# Patient Record
Sex: FEMALE | Race: OTHER | HISPANIC OR LATINO
[De-identification: names, ages, dates, MRNs, and addresses within clinical notes are randomized per-mention and may not be internally consistent; named-entity substitution may affect disease eponyms.]

---

## 2022-06-16 PROBLEM — Z00.00 ENCOUNTER FOR PREVENTIVE HEALTH EXAMINATION: Status: ACTIVE | Noted: 2022-06-16

## 2022-06-20 ENCOUNTER — APPOINTMENT (OUTPATIENT)
Dept: OBGYN | Facility: CLINIC | Age: 27
End: 2022-06-20
Payer: COMMERCIAL

## 2022-06-20 ENCOUNTER — APPOINTMENT (OUTPATIENT)
Dept: OBGYN | Facility: CLINIC | Age: 27
End: 2022-06-20

## 2022-06-20 ENCOUNTER — ASOB RESULT (OUTPATIENT)
Age: 27
End: 2022-06-20

## 2022-06-20 VITALS
OXYGEN SATURATION: 100 % | WEIGHT: 161 LBS | BODY MASS INDEX: 28.53 KG/M2 | SYSTOLIC BLOOD PRESSURE: 122 MMHG | RESPIRATION RATE: 16 BRPM | HEIGHT: 63 IN | DIASTOLIC BLOOD PRESSURE: 82 MMHG | HEART RATE: 90 BPM | TEMPERATURE: 98.1 F

## 2022-06-20 DIAGNOSIS — Z01.419 ENCOUNTER FOR GYNECOLOGICAL EXAMINATION (GENERAL) (ROUTINE) W/OUT ABNORMAL FINDINGS: ICD-10-CM

## 2022-06-20 PROCEDURE — 99395 PREV VISIT EST AGE 18-39: CPT

## 2022-06-20 PROCEDURE — 99203 OFFICE O/P NEW LOW 30 MIN: CPT | Mod: 25

## 2022-06-20 PROCEDURE — 76830 TRANSVAGINAL US NON-OB: CPT

## 2022-06-20 PROCEDURE — 99072 ADDL SUPL MATRL&STAF TM PHE: CPT

## 2022-06-20 PROCEDURE — 76817 TRANSVAGINAL US OBSTETRIC: CPT

## 2022-06-20 NOTE — HISTORY OF PRESENT ILLNESS
[FreeTextEntry1] : ALIA MEAD 25 YO, presents for Annual & Delayed menses\par  G 1  P 0000\par LMP: 04/30/2022  Irregular Menses\par Medication  Levothyroxine 112 mcg.\par Urine- UCG positive @ home  06/12/2022. \par Non smoker, no cats. \par No other health issues. \par No complaints.

## 2022-06-20 NOTE — PROCEDURE
[Transvaginal OB Sonogram] : Transvaginal OB Sonogram [Intrauterine Pregnancy] : intrauterine pregnancy [Yolk Sac] : yolk sac present [Fetal Heart] : fetal heart present [Transvaginal OB Sonogram WNL] : Transvaginal OB Sonogram WNL [FreeTextEntry1] : Viable SIUP @ 6.4 weeks giving an RUPINDER of 2/09/23

## 2022-06-22 LAB
C TRACH RRNA SPEC QL NAA+PROBE: NOT DETECTED
N GONORRHOEA RRNA SPEC QL NAA+PROBE: NOT DETECTED
SOURCE TP AMPLIFICATION: NORMAL

## 2022-06-24 LAB — CYTOLOGY CVX/VAG DOC THIN PREP: NORMAL

## 2022-07-07 ENCOUNTER — ASOB RESULT (OUTPATIENT)
Age: 27
End: 2022-07-07

## 2022-07-07 ENCOUNTER — APPOINTMENT (OUTPATIENT)
Dept: OBGYN | Facility: CLINIC | Age: 27
End: 2022-07-07

## 2022-07-07 ENCOUNTER — TRANSCRIPTION ENCOUNTER (OUTPATIENT)
Age: 27
End: 2022-07-07

## 2022-07-07 VITALS
TEMPERATURE: 98.1 F | RESPIRATION RATE: 16 BRPM | WEIGHT: 164 LBS | BODY MASS INDEX: 29.06 KG/M2 | DIASTOLIC BLOOD PRESSURE: 78 MMHG | HEART RATE: 89 BPM | SYSTOLIC BLOOD PRESSURE: 118 MMHG | OXYGEN SATURATION: 98 % | HEIGHT: 63 IN

## 2022-07-07 DIAGNOSIS — Z71.7 HUMAN IMMUNODEFICIENCY VIRUS [HIV] COUNSELING: ICD-10-CM

## 2022-07-07 DIAGNOSIS — Z34.90 ENCOUNTER FOR SUPERVISION OF NORMAL PREGNANCY, UNSPECIFIED, UNSPECIFIED TRIMESTER: ICD-10-CM

## 2022-07-07 DIAGNOSIS — N92.6 IRREGULAR MENSTRUATION, UNSPECIFIED: ICD-10-CM

## 2022-07-07 PROCEDURE — 76801 OB US < 14 WKS SINGLE FETUS: CPT

## 2022-07-07 PROCEDURE — 99213 OFFICE O/P EST LOW 20 MIN: CPT

## 2022-07-07 PROCEDURE — 99072 ADDL SUPL MATRL&STAF TM PHE: CPT

## 2022-07-07 NOTE — PROCEDURE
[Transvaginal OB Sonogram] : Transvaginal OB Sonogram [Intrauterine Pregnancy] : intrauterine pregnancy [Yolk Sac] : yolk sac present [Fetal Heart] : fetal heart present [Date: ___] : Date: [unfilled] [Current GA by Sonogram: ___ (wks)] : Current GA by Sonogram: [unfilled]Uwks [___ day(s)] : [unfilled] days [Transvaginal OB Sonogram WNL] : Transvaginal OB Sonogram WNL [FreeTextEntry1] : Viable SIUP @ 9.1 weeks. \par Giving an RUPINDER of 02/08/23.

## 2022-07-07 NOTE — HISTORY OF PRESENT ILLNESS
[FreeTextEntry1] : ALIA MEAD 25 YO, presents for Results,  Sonogram & Blood work.referral \par  G 1  P 0000\par LMP: 04/30/2022  Regular Menses\par Medication Levothyroxine 112 mcg.\par PAP- WNL, NG & CT- Negative. \par Patients insurance does not cover [facility].\par Will send her to another lab.\par HIV Counseling done. \par NT & US @ [facility]- 7/25 to 8/05/22\par \par \par

## 2022-07-27 ENCOUNTER — APPOINTMENT (OUTPATIENT)
Dept: ANTEPARTUM | Facility: CLINIC | Age: 27
End: 2022-07-27

## 2022-07-27 ENCOUNTER — ASOB RESULT (OUTPATIENT)
Age: 27
End: 2022-07-27

## 2022-07-27 ENCOUNTER — LABORATORY RESULT (OUTPATIENT)
Age: 27
End: 2022-07-27

## 2022-07-27 PROCEDURE — 36415 COLL VENOUS BLD VENIPUNCTURE: CPT

## 2022-07-27 PROCEDURE — 99072 ADDL SUPL MATRL&STAF TM PHE: CPT

## 2022-07-27 PROCEDURE — 76813 OB US NUCHAL MEAS 1 GEST: CPT

## 2022-07-27 PROCEDURE — 76801 OB US < 14 WKS SINGLE FETUS: CPT

## 2022-08-04 ENCOUNTER — APPOINTMENT (OUTPATIENT)
Dept: OBGYN | Facility: CLINIC | Age: 27
End: 2022-08-04

## 2022-08-04 VITALS
RESPIRATION RATE: 16 BRPM | OXYGEN SATURATION: 99 % | DIASTOLIC BLOOD PRESSURE: 84 MMHG | WEIGHT: 165 LBS | BODY MASS INDEX: 29.23 KG/M2 | TEMPERATURE: 98.7 F | SYSTOLIC BLOOD PRESSURE: 122 MMHG | HEART RATE: 99 BPM | HEIGHT: 63 IN

## 2022-08-04 PROCEDURE — 0502F SUBSEQUENT PRENATAL CARE: CPT

## 2022-08-25 ENCOUNTER — APPOINTMENT (OUTPATIENT)
Dept: OBGYN | Facility: CLINIC | Age: 27
End: 2022-08-25

## 2022-08-25 VITALS
SYSTOLIC BLOOD PRESSURE: 128 MMHG | RESPIRATION RATE: 17 BRPM | BODY MASS INDEX: 29.95 KG/M2 | DIASTOLIC BLOOD PRESSURE: 84 MMHG | HEART RATE: 109 BPM | WEIGHT: 169 LBS | OXYGEN SATURATION: 100 % | TEMPERATURE: 98.6 F | HEIGHT: 63 IN

## 2022-08-25 PROCEDURE — 36415 COLL VENOUS BLD VENIPUNCTURE: CPT

## 2022-08-25 PROCEDURE — 0502F SUBSEQUENT PRENATAL CARE: CPT

## 2022-08-31 LAB — AFP PNL SERPL: NORMAL

## 2022-09-07 ENCOUNTER — NON-APPOINTMENT (OUTPATIENT)
Age: 27
End: 2022-09-07

## 2022-09-08 ENCOUNTER — APPOINTMENT (OUTPATIENT)
Dept: OBGYN | Facility: CLINIC | Age: 27
End: 2022-09-08

## 2022-09-08 VITALS
HEART RATE: 101 BPM | RESPIRATION RATE: 17 BRPM | HEIGHT: 63 IN | WEIGHT: 171 LBS | TEMPERATURE: 98.7 F | DIASTOLIC BLOOD PRESSURE: 84 MMHG | OXYGEN SATURATION: 100 % | SYSTOLIC BLOOD PRESSURE: 122 MMHG | BODY MASS INDEX: 30.3 KG/M2

## 2022-09-08 PROCEDURE — 0502F SUBSEQUENT PRENATAL CARE: CPT

## 2022-09-08 PROCEDURE — 36415 COLL VENOUS BLD VENIPUNCTURE: CPT

## 2022-09-09 NOTE — HISTORY OF PRESENT ILLNESS
[FreeTextEntry1] : ALIA MEAD 26 YO, presents for Blood work\par G 1  \par LMP: 04/30/2022 - Regular  Menses\par Medication: Levothyroxine, Prenatals\par

## 2022-09-21 ENCOUNTER — ASOB RESULT (OUTPATIENT)
Age: 27
End: 2022-09-21

## 2022-09-21 ENCOUNTER — APPOINTMENT (OUTPATIENT)
Dept: ANTEPARTUM | Facility: CLINIC | Age: 27
End: 2022-09-21

## 2022-09-21 PROCEDURE — 76811 OB US DETAILED SNGL FETUS: CPT

## 2022-09-21 PROCEDURE — 99072 ADDL SUPL MATRL&STAF TM PHE: CPT

## 2022-09-21 PROCEDURE — 99202 OFFICE O/P NEW SF 15 MIN: CPT | Mod: 25

## 2022-09-22 ENCOUNTER — APPOINTMENT (OUTPATIENT)
Dept: OBGYN | Facility: CLINIC | Age: 27
End: 2022-09-22

## 2022-10-06 ENCOUNTER — NON-APPOINTMENT (OUTPATIENT)
Age: 27
End: 2022-10-06

## 2022-10-06 ENCOUNTER — APPOINTMENT (OUTPATIENT)
Dept: OBGYN | Facility: CLINIC | Age: 27
End: 2022-10-06

## 2022-10-06 VITALS
RESPIRATION RATE: 16 BRPM | WEIGHT: 177 LBS | OXYGEN SATURATION: 100 % | BODY MASS INDEX: 31.36 KG/M2 | TEMPERATURE: 97.3 F | SYSTOLIC BLOOD PRESSURE: 124 MMHG | HEIGHT: 63 IN | HEART RATE: 96 BPM | DIASTOLIC BLOOD PRESSURE: 84 MMHG

## 2022-10-06 PROCEDURE — 0502F SUBSEQUENT PRENATAL CARE: CPT

## 2022-10-20 ENCOUNTER — APPOINTMENT (OUTPATIENT)
Dept: ANTEPARTUM | Facility: CLINIC | Age: 27
End: 2022-10-20

## 2022-10-20 ENCOUNTER — ASOB RESULT (OUTPATIENT)
Age: 27
End: 2022-10-20

## 2022-10-20 PROCEDURE — 76816 OB US FOLLOW-UP PER FETUS: CPT | Mod: 59

## 2022-10-20 PROCEDURE — 76819 FETAL BIOPHYS PROFIL W/O NST: CPT

## 2022-10-20 PROCEDURE — 99072 ADDL SUPL MATRL&STAF TM PHE: CPT

## 2022-10-27 ENCOUNTER — NON-APPOINTMENT (OUTPATIENT)
Age: 27
End: 2022-10-27

## 2022-10-27 ENCOUNTER — APPOINTMENT (OUTPATIENT)
Dept: OBGYN | Facility: CLINIC | Age: 27
End: 2022-10-27

## 2022-10-27 VITALS
BODY MASS INDEX: 31.89 KG/M2 | WEIGHT: 180 LBS | TEMPERATURE: 98.4 F | HEART RATE: 99 BPM | SYSTOLIC BLOOD PRESSURE: 118 MMHG | OXYGEN SATURATION: 99 % | DIASTOLIC BLOOD PRESSURE: 80 MMHG | RESPIRATION RATE: 16 BRPM | HEIGHT: 63 IN

## 2022-10-27 PROCEDURE — 0502F SUBSEQUENT PRENATAL CARE: CPT

## 2022-11-16 ENCOUNTER — NON-APPOINTMENT (OUTPATIENT)
Age: 27
End: 2022-11-16

## 2022-11-17 ENCOUNTER — LABORATORY RESULT (OUTPATIENT)
Age: 27
End: 2022-11-17

## 2022-11-17 ENCOUNTER — APPOINTMENT (OUTPATIENT)
Dept: OBGYN | Facility: CLINIC | Age: 27
End: 2022-11-17

## 2022-11-17 VITALS
BODY MASS INDEX: 31.89 KG/M2 | HEIGHT: 63 IN | SYSTOLIC BLOOD PRESSURE: 110 MMHG | RESPIRATION RATE: 16 BRPM | TEMPERATURE: 98 F | WEIGHT: 180 LBS | HEART RATE: 100 BPM | DIASTOLIC BLOOD PRESSURE: 75 MMHG | OXYGEN SATURATION: 97 %

## 2022-11-17 PROCEDURE — 0502F SUBSEQUENT PRENATAL CARE: CPT

## 2022-11-17 PROCEDURE — 36415 COLL VENOUS BLD VENIPUNCTURE: CPT

## 2022-11-18 LAB
APPEARANCE: ABNORMAL
BASOPHILS # BLD AUTO: 0.03 K/UL
BASOPHILS NFR BLD AUTO: 0.3 %
BILIRUBIN URINE: NEGATIVE
BLOOD URINE: NEGATIVE
COLOR: NORMAL
EOSINOPHIL # BLD AUTO: 0.12 K/UL
EOSINOPHIL NFR BLD AUTO: 1.3 %
GLUCOSE 1H P 50 G GLC PO SERPL-MCNC: 150 MG/DL
GLUCOSE QUALITATIVE U: NEGATIVE
HCT VFR BLD CALC: 36.8 %
HGB BLD-MCNC: 11.6 G/DL
IMM GRANULOCYTES NFR BLD AUTO: 1.2 %
KETONES URINE: NEGATIVE
LEUKOCYTE ESTERASE URINE: NEGATIVE
LYMPHOCYTES # BLD AUTO: 1.42 K/UL
LYMPHOCYTES NFR BLD AUTO: 15.9 %
MAN DIFF?: NORMAL
MCHC RBC-ENTMCNC: 30.8 PG
MCHC RBC-ENTMCNC: 31.5 GM/DL
MCV RBC AUTO: 97.6 FL
MONOCYTES # BLD AUTO: 0.67 K/UL
MONOCYTES NFR BLD AUTO: 7.5 %
NEUTROPHILS # BLD AUTO: 6.57 K/UL
NEUTROPHILS NFR BLD AUTO: 73.8 %
NITRITE URINE: NEGATIVE
PH URINE: 7
PLATELET # BLD AUTO: 256 K/UL
PROTEIN URINE: NORMAL
RBC # BLD: 3.77 M/UL
RBC # FLD: 13.2 %
SPECIFIC GRAVITY URINE: 1.01
UROBILINOGEN URINE: NORMAL
WBC # FLD AUTO: 8.92 K/UL

## 2022-12-06 ENCOUNTER — NON-APPOINTMENT (OUTPATIENT)
Age: 27
End: 2022-12-06

## 2022-12-07 ENCOUNTER — APPOINTMENT (OUTPATIENT)
Dept: OBGYN | Facility: CLINIC | Age: 27
End: 2022-12-07

## 2022-12-07 ENCOUNTER — ASOB RESULT (OUTPATIENT)
Age: 27
End: 2022-12-07

## 2022-12-07 VITALS
HEIGHT: 63 IN | WEIGHT: 182 LBS | DIASTOLIC BLOOD PRESSURE: 80 MMHG | TEMPERATURE: 98.2 F | OXYGEN SATURATION: 98 % | SYSTOLIC BLOOD PRESSURE: 118 MMHG | RESPIRATION RATE: 16 BRPM | HEART RATE: 96 BPM | BODY MASS INDEX: 32.25 KG/M2

## 2022-12-07 PROCEDURE — 36415 COLL VENOUS BLD VENIPUNCTURE: CPT

## 2022-12-07 PROCEDURE — 76819 FETAL BIOPHYS PROFIL W/O NST: CPT

## 2022-12-07 PROCEDURE — 0502F SUBSEQUENT PRENATAL CARE: CPT

## 2022-12-09 ENCOUNTER — APPOINTMENT (OUTPATIENT)
Dept: OBGYN | Facility: CLINIC | Age: 27
End: 2022-12-09

## 2022-12-09 LAB — GLUCOSE 2H P MEAL SERPL-MCNC: 90 MG/DL

## 2022-12-09 PROCEDURE — 99441: CPT

## 2022-12-09 RX ORDER — BLOOD SUGAR DIAGNOSTIC
STRIP MISCELLANEOUS 4 TIMES DAILY
Qty: 120 | Refills: 8 | Status: ACTIVE | COMMUNITY
Start: 2022-12-09 | End: 1900-01-01

## 2022-12-09 RX ORDER — LANCETS 28 GAUGE
EACH MISCELLANEOUS
Qty: 120 | Refills: 8 | Status: ACTIVE | COMMUNITY
Start: 2022-12-09 | End: 1900-01-01

## 2022-12-09 RX ORDER — BLOOD-GLUCOSE METER
W/DEVICE KIT MISCELLANEOUS
Qty: 1 | Refills: 0 | Status: ACTIVE | COMMUNITY
Start: 2022-12-09 | End: 1900-01-01

## 2022-12-19 ENCOUNTER — ASOB RESULT (OUTPATIENT)
Age: 27
End: 2022-12-19

## 2022-12-19 ENCOUNTER — APPOINTMENT (OUTPATIENT)
Dept: MATERNAL FETAL MEDICINE | Facility: CLINIC | Age: 27
End: 2022-12-19
Payer: COMMERCIAL

## 2022-12-19 DIAGNOSIS — O24.419 GESTATIONAL DIABETES MELLITUS IN PREGNANCY, UNSPECIFIED CONTROL: ICD-10-CM

## 2022-12-19 PROCEDURE — G0109 DIAB MANAGE TRN IND/GROUP: CPT | Mod: 95

## 2022-12-19 PROCEDURE — G0108 DIAB MANAGE TRN  PER INDIV: CPT | Mod: 95

## 2022-12-20 RX ORDER — URINE ACETONE TEST STRIPS
STRIP MISCELLANEOUS
Qty: 1 | Refills: 2 | Status: ACTIVE | COMMUNITY
Start: 2022-12-19 | End: 1900-01-01

## 2022-12-22 ENCOUNTER — NON-APPOINTMENT (OUTPATIENT)
Age: 27
End: 2022-12-22

## 2022-12-22 ENCOUNTER — APPOINTMENT (OUTPATIENT)
Dept: OBGYN | Facility: CLINIC | Age: 27
End: 2022-12-22

## 2022-12-22 VITALS
BODY MASS INDEX: 32.25 KG/M2 | DIASTOLIC BLOOD PRESSURE: 78 MMHG | WEIGHT: 182 LBS | HEART RATE: 113 BPM | RESPIRATION RATE: 17 BRPM | TEMPERATURE: 97.6 F | SYSTOLIC BLOOD PRESSURE: 122 MMHG | HEIGHT: 63 IN | OXYGEN SATURATION: 97 %

## 2022-12-22 LAB
BILIRUB UR QL STRIP: NEGATIVE
CLARITY UR: CLEAR
COLLECTION METHOD: NORMAL
GLUCOSE UR-MCNC: NEGATIVE
HCG UR QL: 0.2 EU/DL
HGB UR QL STRIP.AUTO: NEGATIVE
KETONES UR-MCNC: NEGATIVE
LEUKOCYTE ESTERASE UR QL STRIP: NEGATIVE
NITRITE UR QL STRIP: NEGATIVE
PH UR STRIP: 6
PROT UR STRIP-MCNC: NEGATIVE
SP GR UR STRIP: 1.01

## 2022-12-22 PROCEDURE — 81002 URINALYSIS NONAUTO W/O SCOPE: CPT

## 2022-12-22 PROCEDURE — 0502F SUBSEQUENT PRENATAL CARE: CPT

## 2022-12-27 ENCOUNTER — ASOB RESULT (OUTPATIENT)
Age: 27
End: 2022-12-27

## 2022-12-27 ENCOUNTER — APPOINTMENT (OUTPATIENT)
Dept: ANTEPARTUM | Facility: CLINIC | Age: 27
End: 2022-12-27

## 2022-12-27 PROCEDURE — 99072 ADDL SUPL MATRL&STAF TM PHE: CPT

## 2022-12-27 PROCEDURE — 76816 OB US FOLLOW-UP PER FETUS: CPT

## 2022-12-27 PROCEDURE — 76819 FETAL BIOPHYS PROFIL W/O NST: CPT | Mod: 59

## 2023-01-03 ENCOUNTER — APPOINTMENT (OUTPATIENT)
Dept: MATERNAL FETAL MEDICINE | Facility: CLINIC | Age: 28
End: 2023-01-03
Payer: COMMERCIAL

## 2023-01-03 ENCOUNTER — ASOB RESULT (OUTPATIENT)
Age: 28
End: 2023-01-03

## 2023-01-03 PROCEDURE — G0108 DIAB MANAGE TRN  PER INDIV: CPT | Mod: 95

## 2023-01-04 ENCOUNTER — APPOINTMENT (OUTPATIENT)
Dept: ANTEPARTUM | Facility: CLINIC | Age: 28
End: 2023-01-04

## 2023-01-12 ENCOUNTER — NON-APPOINTMENT (OUTPATIENT)
Age: 28
End: 2023-01-12

## 2023-01-12 ENCOUNTER — APPOINTMENT (OUTPATIENT)
Dept: OBGYN | Facility: CLINIC | Age: 28
End: 2023-01-12
Payer: COMMERCIAL

## 2023-01-12 ENCOUNTER — ASOB RESULT (OUTPATIENT)
Age: 28
End: 2023-01-12

## 2023-01-12 VITALS
WEIGHT: 184 LBS | OXYGEN SATURATION: 99 % | DIASTOLIC BLOOD PRESSURE: 74 MMHG | BODY MASS INDEX: 32.6 KG/M2 | HEART RATE: 100 BPM | TEMPERATURE: 97.3 F | HEIGHT: 63 IN | SYSTOLIC BLOOD PRESSURE: 108 MMHG | RESPIRATION RATE: 16 BRPM

## 2023-01-12 PROCEDURE — 81002 URINALYSIS NONAUTO W/O SCOPE: CPT

## 2023-01-12 PROCEDURE — 76818 FETAL BIOPHYS PROFILE W/NST: CPT

## 2023-01-12 PROCEDURE — 0502F SUBSEQUENT PRENATAL CARE: CPT

## 2023-01-12 PROCEDURE — 76805 OB US >/= 14 WKS SNGL FETUS: CPT

## 2023-01-14 LAB
GP B STREP DNA SPEC QL NAA+PROBE: NOT DETECTED
SOURCE GBS: NORMAL

## 2023-01-17 ENCOUNTER — APPOINTMENT (OUTPATIENT)
Dept: MATERNAL FETAL MEDICINE | Facility: CLINIC | Age: 28
End: 2023-01-17
Payer: COMMERCIAL

## 2023-01-17 ENCOUNTER — ASOB RESULT (OUTPATIENT)
Age: 28
End: 2023-01-17

## 2023-01-17 PROCEDURE — G0108 DIAB MANAGE TRN  PER INDIV: CPT | Mod: 95

## 2023-01-19 ENCOUNTER — RESULT CHARGE (OUTPATIENT)
Age: 28
End: 2023-01-19

## 2023-01-19 ENCOUNTER — APPOINTMENT (OUTPATIENT)
Dept: OBGYN | Facility: CLINIC | Age: 28
End: 2023-01-19
Payer: COMMERCIAL

## 2023-01-19 ENCOUNTER — ASOB RESULT (OUTPATIENT)
Age: 28
End: 2023-01-19

## 2023-01-19 ENCOUNTER — OUTPATIENT (OUTPATIENT)
Dept: INPATIENT UNIT | Facility: HOSPITAL | Age: 28
LOS: 1 days | Discharge: HOME | End: 2023-01-19
Payer: COMMERCIAL

## 2023-01-19 VITALS
DIASTOLIC BLOOD PRESSURE: 70 MMHG | HEART RATE: 98 BPM | OXYGEN SATURATION: 98 % | TEMPERATURE: 97.7 F | SYSTOLIC BLOOD PRESSURE: 106 MMHG | BODY MASS INDEX: 32.96 KG/M2 | WEIGHT: 186 LBS | RESPIRATION RATE: 16 BRPM | HEIGHT: 63 IN

## 2023-01-19 VITALS
RESPIRATION RATE: 16 BRPM | SYSTOLIC BLOOD PRESSURE: 135 MMHG | DIASTOLIC BLOOD PRESSURE: 87 MMHG | HEART RATE: 101 BPM | TEMPERATURE: 99 F

## 2023-01-19 VITALS — HEART RATE: 84 BPM | DIASTOLIC BLOOD PRESSURE: 57 MMHG | SYSTOLIC BLOOD PRESSURE: 105 MMHG

## 2023-01-19 DIAGNOSIS — O26.899 OTHER SPECIFIED PREGNANCY RELATED CONDITIONS, UNSPECIFIED TRIMESTER: ICD-10-CM

## 2023-01-19 PROCEDURE — 76818 FETAL BIOPHYS PROFILE W/NST: CPT

## 2023-01-19 PROCEDURE — 0502F SUBSEQUENT PRENATAL CARE: CPT

## 2023-01-19 PROCEDURE — 99214 OFFICE O/P EST MOD 30 MIN: CPT

## 2023-01-19 PROCEDURE — 76818 FETAL BIOPHYS PROFILE W/NST: CPT | Mod: 26

## 2023-01-19 PROCEDURE — 81002 URINALYSIS NONAUTO W/O SCOPE: CPT

## 2023-01-19 NOTE — OB PROVIDER TRIAGE NOTE - ADDITIONAL INSTRUCTIONS
Follow up at next scheduled prenatal appointment  Return for decreased fetal movement, loss of fluid or vaginal bleeding  Increase oral hydration  Continue to monitor finger sticks as directed for gestational diabetes   Signs and Symptoms of Labor reviewed  Kick Counts Reviewed

## 2023-01-19 NOTE — OB PROVIDER TRIAGE NOTE - PLAN OF CARE
D/C Home  D/W Dr. Herman  NST Reactive  BPP8/8  No evidence of acute process  Normal fetal testing  Follow up at next scheduled prenatal appointment  Return for decreased fetal movement, loss of fluid or vaginal bleeding  Increase oral hydration  Continue to monitor finger sticks as directed for gestational diabetes   Signs and Symptoms of Labor reviewed  Kick Counts Reviewed

## 2023-01-19 NOTE — OB RN TRIAGE NOTE - FALL HARM RISK - UNIVERSAL INTERVENTIONS
Bed in lowest position, wheels locked, appropriate side rails in place/Call bell, personal items and telephone in reach/Instruct patient to call for assistance before getting out of bed or chair/Non-slip footwear when patient is out of bed/[place] to call system/Physically safe environment - no spills, clutter or unnecessary equipment/Purposeful Proactive Rounding/Room/bathroom lighting operational, light cord in reach

## 2023-01-19 NOTE — OB PROVIDER TRIAGE NOTE - HISTORY OF PRESENT ILLNESS
28y/o  @37.1wks presents from Dr. Alcaraz's office with variables on NST and lizzy of 6.5cm.  Reports good fetal movement  Denies LOF/VB    Allergies: Denies  Medications: PNV, Levothyroxine 150mcg    Medical HX: Hypothyroidism  Surgical HX: Denies  Denies etoh/smoke/drugs/psy   28y/o  @37.1wks presents from Dr. Alcaraz's office with variables on NST and lizzy of 6.5cm.  Reports good fetal movement  Denies LOF/VB  Denies abdominal pain or contractions     Allergies: Denies  Medications: PNV, Levothyroxine 150mcg    Medical HX: Hypothyroidism  Surgical HX: Denies  Denies etoh/smoke/drugs/psy

## 2023-01-19 NOTE — OB PROVIDER TRIAGE NOTE - NSHPPHYSICALEXAM_GEN_ALL_CORE
Vital Signs Last 24 Hrs  T(C): 37.3 (19 Jan 2023 18:59), Max: 37.3 (19 Jan 2023 18:59)  T(F): 99.1 (19 Jan 2023 18:59), Max: 99.1 (19 Jan 2023 18:59)  HR: 96 (19 Jan 2023 19:19) (96 - 101)  BP: 112/68 (19 Jan 2023 19:19) (112/68 - 135/87)  RR: 16 (19 Jan 2023 18:59) (16 - 16)    Assessment reveals VSS  General: Female sitting comfortably in no apparent distress  Neuro: No facial asymmetry, no slurred speech, moves all 4 extremities  Mood: Alert and lucid, appropriate mood and affect  A&Ox3  Lungs- clear bilateral  Heart- normal rate and regular rhythm  Extremities- Warm, Dry, no edema present, good pulses   Abdomen soft, NT, gravid  Cat 1 tracing, occasional   Transabdominal Ultrasound-       PLAN: Vital Signs Last 24 Hrs  T(C): 37.3 (19 Jan 2023 18:59), Max: 37.3 (19 Jan 2023 18:59)  T(F): 99.1 (19 Jan 2023 18:59), Max: 99.1 (19 Jan 2023 18:59)  HR: 96 (19 Jan 2023 19:19) (96 - 101)  BP: 112/68 (19 Jan 2023 19:19) (112/68 - 135/87)  RR: 16 (19 Jan 2023 18:59) (16 - 16)    Assessment reveals VSS  General: Female sitting comfortably in no apparent distress  Neuro: No facial asymmetry, no slurred speech, moves all 4 extremities  Mood: Alert and lucid, appropriate mood and affect  A&Ox3  Lungs- clear bilateral  Heart- normal rate and regular rhythm  Extremities- Warm, Dry, no edema present, good pulses   Abdomen soft, NT, gravid  Cat 1 tracing, occasional   Transabdominal Ultrasound- vtx, ant placenta, bpp8/8, lizzy: 11.11      PLAN: D/C Home

## 2023-01-23 ENCOUNTER — APPOINTMENT (OUTPATIENT)
Dept: ANTEPARTUM | Facility: CLINIC | Age: 28
End: 2023-01-23
Payer: COMMERCIAL

## 2023-01-23 ENCOUNTER — ASOB RESULT (OUTPATIENT)
Age: 28
End: 2023-01-23

## 2023-01-23 PROBLEM — E03.9 HYPOTHYROIDISM, UNSPECIFIED: Chronic | Status: ACTIVE | Noted: 2023-01-19

## 2023-01-23 PROCEDURE — 99072 ADDL SUPL MATRL&STAF TM PHE: CPT

## 2023-01-23 PROCEDURE — 76819 FETAL BIOPHYS PROFIL W/O NST: CPT

## 2023-01-23 PROCEDURE — ZZZZZ: CPT

## 2023-01-23 PROCEDURE — 99212 OFFICE O/P EST SF 10 MIN: CPT | Mod: 25

## 2023-01-24 ENCOUNTER — APPOINTMENT (OUTPATIENT)
Dept: MATERNAL FETAL MEDICINE | Facility: CLINIC | Age: 28
End: 2023-01-24
Payer: COMMERCIAL

## 2023-01-24 ENCOUNTER — ASOB RESULT (OUTPATIENT)
Age: 28
End: 2023-01-24

## 2023-01-24 PROCEDURE — G0108 DIAB MANAGE TRN  PER INDIV: CPT | Mod: 95

## 2023-01-25 DIAGNOSIS — Z3A.37 37 WEEKS GESTATION OF PREGNANCY: ICD-10-CM

## 2023-01-25 DIAGNOSIS — Z03.79 ENCOUNTER FOR OTHER SUSPECTED MATERNAL AND FETAL CONDITIONS RULED OUT: ICD-10-CM

## 2023-01-25 DIAGNOSIS — O99.283 ENDOCRINE, NUTRITIONAL AND METABOLIC DISEASES COMPLICATING PREGNANCY, THIRD TRIMESTER: ICD-10-CM

## 2023-01-25 DIAGNOSIS — E03.9 HYPOTHYROIDISM, UNSPECIFIED: ICD-10-CM

## 2023-01-25 DIAGNOSIS — O24.410 GESTATIONAL DIABETES MELLITUS IN PREGNANCY, DIET CONTROLLED: ICD-10-CM

## 2023-01-26 ENCOUNTER — NON-APPOINTMENT (OUTPATIENT)
Age: 28
End: 2023-01-26

## 2023-01-26 ENCOUNTER — APPOINTMENT (OUTPATIENT)
Dept: OBGYN | Facility: CLINIC | Age: 28
End: 2023-01-26
Payer: COMMERCIAL

## 2023-01-26 ENCOUNTER — ASOB RESULT (OUTPATIENT)
Age: 28
End: 2023-01-26

## 2023-01-26 VITALS
TEMPERATURE: 97.5 F | HEIGHT: 63 IN | HEART RATE: 96 BPM | WEIGHT: 188 LBS | BODY MASS INDEX: 33.31 KG/M2 | DIASTOLIC BLOOD PRESSURE: 86 MMHG | OXYGEN SATURATION: 98 % | SYSTOLIC BLOOD PRESSURE: 118 MMHG | RESPIRATION RATE: 16 BRPM

## 2023-01-26 DIAGNOSIS — Z34.90 ENCOUNTER FOR SUPERVISION OF NORMAL PREGNANCY, UNSPECIFIED, UNSPECIFIED TRIMESTER: ICD-10-CM

## 2023-01-26 LAB
BILIRUB UR QL STRIP: NEGATIVE
CLARITY UR: CLEAR
COLLECTION METHOD: NORMAL
GLUCOSE UR-MCNC: NEGATIVE
HCG UR QL: 0.2 EU/DL
HGB UR QL STRIP.AUTO: NEGATIVE
KETONES UR-MCNC: NEGATIVE
LEUKOCYTE ESTERASE UR QL STRIP: NEGATIVE
NITRITE UR QL STRIP: NEGATIVE
PH UR STRIP: 7
PROT UR STRIP-MCNC: NEGATIVE
SP GR UR STRIP: 1.01

## 2023-01-26 PROCEDURE — 81002 URINALYSIS NONAUTO W/O SCOPE: CPT

## 2023-01-26 PROCEDURE — 0502F SUBSEQUENT PRENATAL CARE: CPT

## 2023-01-26 PROCEDURE — 76818 FETAL BIOPHYS PROFILE W/NST: CPT

## 2023-02-01 ENCOUNTER — TRANSCRIPTION ENCOUNTER (OUTPATIENT)
Age: 28
End: 2023-02-01

## 2023-02-01 ENCOUNTER — INPATIENT (INPATIENT)
Facility: HOSPITAL | Age: 28
LOS: 1 days | Discharge: HOME | End: 2023-02-03
Attending: OBSTETRICS & GYNECOLOGY | Admitting: OBSTETRICS & GYNECOLOGY
Payer: COMMERCIAL

## 2023-02-01 VITALS
DIASTOLIC BLOOD PRESSURE: 82 MMHG | HEART RATE: 96 BPM | SYSTOLIC BLOOD PRESSURE: 136 MMHG | RESPIRATION RATE: 16 BRPM | TEMPERATURE: 98 F

## 2023-02-01 DIAGNOSIS — O26.899 OTHER SPECIFIED PREGNANCY RELATED CONDITIONS, UNSPECIFIED TRIMESTER: ICD-10-CM

## 2023-02-01 LAB
BASOPHILS # BLD AUTO: 0.03 K/UL — SIGNIFICANT CHANGE UP (ref 0–0.2)
BASOPHILS NFR BLD AUTO: 0.5 % — SIGNIFICANT CHANGE UP (ref 0–2)
BLD GP AB SCN SERPL QL: NEGATIVE — SIGNIFICANT CHANGE UP
COVID-19 SPIKE DOMAIN AB INTERP: POSITIVE
COVID-19 SPIKE DOMAIN ANTIBODY RESULT: >250 U/ML — HIGH
EOSINOPHIL # BLD AUTO: 0.03 K/UL — SIGNIFICANT CHANGE UP (ref 0–0.5)
EOSINOPHIL NFR BLD AUTO: 0.5 % — SIGNIFICANT CHANGE UP (ref 0–6)
GLUCOSE BLDC GLUCOMTR-MCNC: 82 MG/DL — SIGNIFICANT CHANGE UP (ref 70–99)
GLUCOSE BLDC GLUCOMTR-MCNC: 83 MG/DL — SIGNIFICANT CHANGE UP (ref 70–99)
GLUCOSE BLDC GLUCOMTR-MCNC: 87 MG/DL — SIGNIFICANT CHANGE UP (ref 70–99)
GLUCOSE BLDC GLUCOMTR-MCNC: 91 MG/DL — SIGNIFICANT CHANGE UP (ref 70–99)
GLUCOSE BLDC GLUCOMTR-MCNC: 91 MG/DL — SIGNIFICANT CHANGE UP (ref 70–99)
HBV SURFACE AG SERPL QL IA: SIGNIFICANT CHANGE UP
HCT VFR BLD CALC: 38.5 % — SIGNIFICANT CHANGE UP (ref 34.5–45)
HGB BLD-MCNC: 12.9 G/DL — SIGNIFICANT CHANGE UP (ref 11.5–15.5)
HIV 1+2 AB+HIV1 P24 AG SERPL QL IA: SIGNIFICANT CHANGE UP
IANC: 4.24 K/UL — SIGNIFICANT CHANGE UP (ref 1.8–7.4)
IMM GRANULOCYTES NFR BLD AUTO: 0.6 % — SIGNIFICANT CHANGE UP (ref 0–0.9)
LYMPHOCYTES # BLD AUTO: 1.46 K/UL — SIGNIFICANT CHANGE UP (ref 1–3.3)
LYMPHOCYTES # BLD AUTO: 23 % — SIGNIFICANT CHANGE UP (ref 13–44)
MCHC RBC-ENTMCNC: 30.4 PG — SIGNIFICANT CHANGE UP (ref 27–34)
MCHC RBC-ENTMCNC: 33.5 GM/DL — SIGNIFICANT CHANGE UP (ref 32–36)
MCV RBC AUTO: 90.8 FL — SIGNIFICANT CHANGE UP (ref 80–100)
MONOCYTES # BLD AUTO: 0.56 K/UL — SIGNIFICANT CHANGE UP (ref 0–0.9)
MONOCYTES NFR BLD AUTO: 8.8 % — SIGNIFICANT CHANGE UP (ref 2–14)
NEUTROPHILS # BLD AUTO: 4.24 K/UL — SIGNIFICANT CHANGE UP (ref 1.8–7.4)
NEUTROPHILS NFR BLD AUTO: 66.6 % — SIGNIFICANT CHANGE UP (ref 43–77)
NRBC # BLD: 0 /100 WBCS — SIGNIFICANT CHANGE UP (ref 0–0)
NRBC # FLD: 0 K/UL — SIGNIFICANT CHANGE UP (ref 0–0)
PLATELET # BLD AUTO: 205 K/UL — SIGNIFICANT CHANGE UP (ref 150–400)
RBC # BLD: 4.24 M/UL — SIGNIFICANT CHANGE UP (ref 3.8–5.2)
RBC # FLD: 12.9 % — SIGNIFICANT CHANGE UP (ref 10.3–14.5)
RH IG SCN BLD-IMP: POSITIVE — SIGNIFICANT CHANGE UP
RH IG SCN BLD-IMP: POSITIVE — SIGNIFICANT CHANGE UP
RUBV IGG SER-ACNC: 1.1 INDEX — SIGNIFICANT CHANGE UP
RUBV IGG SER-IMP: POSITIVE — SIGNIFICANT CHANGE UP
SARS-COV-2 IGG+IGM SERPL QL IA: >250 U/ML — HIGH
SARS-COV-2 IGG+IGM SERPL QL IA: POSITIVE
SARS-COV-2 RNA SPEC QL NAA+PROBE: SIGNIFICANT CHANGE UP
T PALLIDUM AB TITR SER: NEGATIVE — SIGNIFICANT CHANGE UP
WBC # BLD: 6.36 K/UL — SIGNIFICANT CHANGE UP (ref 3.8–10.5)
WBC # FLD AUTO: 6.36 K/UL — SIGNIFICANT CHANGE UP (ref 3.8–10.5)

## 2023-02-01 RX ORDER — OXYTOCIN 10 UNIT/ML
VIAL (ML) INJECTION
Qty: 30 | Refills: 0 | Status: DISCONTINUED | OUTPATIENT
Start: 2023-02-01 | End: 2023-02-02

## 2023-02-01 RX ORDER — SODIUM CHLORIDE 9 MG/ML
1000 INJECTION, SOLUTION INTRAVENOUS
Refills: 0 | Status: DISCONTINUED | OUTPATIENT
Start: 2023-02-01 | End: 2023-02-01

## 2023-02-01 RX ORDER — INFLUENZA VIRUS VACCINE 15; 15; 15; 15 UG/.5ML; UG/.5ML; UG/.5ML; UG/.5ML
0.5 SUSPENSION INTRAMUSCULAR ONCE
Refills: 0 | Status: DISCONTINUED | OUTPATIENT
Start: 2023-02-01 | End: 2023-02-03

## 2023-02-01 RX ORDER — SODIUM CHLORIDE 9 MG/ML
1000 INJECTION INTRAMUSCULAR; INTRAVENOUS; SUBCUTANEOUS
Refills: 0 | Status: DISCONTINUED | OUTPATIENT
Start: 2023-02-01 | End: 2023-02-01

## 2023-02-01 RX ORDER — SODIUM CHLORIDE 9 MG/ML
1000 INJECTION, SOLUTION INTRAVENOUS
Refills: 0 | Status: DISCONTINUED | OUTPATIENT
Start: 2023-02-01 | End: 2023-02-03

## 2023-02-01 RX ORDER — OXYTOCIN 10 UNIT/ML
333.33 VIAL (ML) INJECTION
Qty: 20 | Refills: 0 | Status: DISCONTINUED | OUTPATIENT
Start: 2023-02-01 | End: 2023-02-02

## 2023-02-01 RX ORDER — LEVOTHYROXINE SODIUM 125 MCG
150 TABLET ORAL DAILY
Refills: 0 | Status: DISCONTINUED | OUTPATIENT
Start: 2023-02-01 | End: 2023-02-03

## 2023-02-01 RX ORDER — CHLORHEXIDINE GLUCONATE 213 G/1000ML
1 SOLUTION TOPICAL ONCE
Refills: 0 | Status: DISCONTINUED | OUTPATIENT
Start: 2023-02-01 | End: 2023-02-02

## 2023-02-01 RX ADMIN — SODIUM CHLORIDE 125 MILLILITER(S): 9 INJECTION, SOLUTION INTRAVENOUS at 16:14

## 2023-02-01 RX ADMIN — SODIUM CHLORIDE 125 MILLILITER(S): 9 INJECTION, SOLUTION INTRAVENOUS at 16:22

## 2023-02-01 RX ADMIN — Medication 150 MICROGRAM(S): at 06:54

## 2023-02-01 RX ADMIN — Medication 2 MILLIUNIT(S)/MIN: at 17:05

## 2023-02-01 NOTE — OB RN TRIAGE NOTE - NS_GESTAGE_OBGYN_ALL_OB_FT
Problem: Falls - Risk of:  Goal: Will remain free from falls  Will remain free from falls   Outcome: Ongoing  No falls thus far this shift. Patient ambulates with one assist and a walker. Patient has been educated on fall precautions and to not get up without assistance. Patient has side-rails up x 3 per patient request.  Goal: Absence of physical injury  Absence of physical injury   Outcome: Completed Date Met: 07/08/18  No injury thus far this shift. Patient has been educated on injury prevention. Problem: Risk for Impaired Skin Integrity  Goal: Tissue integrity - skin and mucous membranes  Structural intactness and normal physiological function of skin and  mucous membranes. Outcome: Ongoing  No new skin injury thus far this shift. Patient has blanchable redness on her buttocks. Patient refuses to turn as recommended. Patient stated that she is repositioning herself and refused pillow placement. Education provided regarding the benefits of repositioning and the risk to skin integrity associated with not repositioning as recommended. Problem: Pain:  Goal: Pain level will decrease  Pain level will decrease   Outcome: Ongoing  Pain Assessment: 0-10  Pain Level: 0   Pain goal:  No Pain  Is pain goal met at this time? Yes     Additional interventions to be implemented: position change    Problem: Discharge Planning:  Goal: Discharged to appropriate level of care  Discharged to appropriate level of care   Outcome: Ongoing  Patient plans to return home with daughter upon discharge. Problem: Nutrition  Goal: Optimal nutrition therapy  Outcome: Completed Date Met: 07/08/18  Patient has excellent appetite. Comments: Care plan reviewed with patient and daughter. Patient and daughter verbalize understanding of the plan of care and contribute to goal setting. 39w

## 2023-02-01 NOTE — OB PROVIDER LABOR PROGRESS NOTE - NS_SUBJECTIVE/OBJECTIVE_OBGYN_ALL_OB_FT
PGY1 Labor & Delivery Progress Note     Pt examined @ 2010 due to cat2 tracing     T(C): 36.7 (02-01-23 @ 18:30), Max: 36.9 (02-01-23 @ 04:19)  HR: 84 (02-01-23 @ 20:08) (62 - 108)  BP: 121/79 (02-01-23 @ 20:00) (101/67 - 155/66)  RR: 16 (02-01-23 @ 18:30) (16 - 19)  SpO2: 100% (02-01-23 @ 20:08) (90% - 100%)

## 2023-02-01 NOTE — OB PROVIDER H&P - PROBLEM SELECTOR PLAN 1
Admit for PROM IOL  Oral Cytotec  D/W Dr. Cuevas  Routine Orders  Covid Swabbed  GBS negative 1/12  GDMA1- Y-Tubing  Finger Sticks every 4 hours- 2 in Active Labor  Synthroid 150mcg, daily ordered

## 2023-02-01 NOTE — OB PROVIDER LABOR PROGRESS NOTE - ASSESSMENT
A/P:   27y  @ 39wga admitted for IOL for PROM@230a    #Labor   - On Oxytocin    #Fetal Wellbeing   - Cat 2. Tracing however w/ moderate variability. IUPC placed for amnioinfusion    # Issues   - Most recent FS 83 (GDMA1)    #Pain Control   -w/ Epi    Humphrey Bowden, PGY-1    d/w Dr. Paredes

## 2023-02-01 NOTE — OB RN TRIAGE NOTE - FALL HARM RISK - UNIVERSAL INTERVENTIONS
Bed in lowest position, wheels locked, appropriate side rails in place/Call bell, personal items and telephone in reach/Instruct patient to call for assistance before getting out of bed or chair/Non-slip footwear when patient is out of bed/[place] to call system/Physically safe environment - no spills, clutter or unnecessary equipment/Purposeful Proactive Rounding/Room/bathroom lighting operational, light cord in reach

## 2023-02-01 NOTE — OB PROVIDER H&P - HISTORY OF PRESENT ILLNESS
26y/o  @39wks presents with leaking of clear fluid since 2:30am and mild cramps.   Reports good fetal movement  Denies VB    Allergies: Denies   Medications: PNV, Synthroid 150mcg    Medical HX: Hypothyroidism   Surgical HX: Denies  Denies Etoh/Smoke/Drugs/Psy

## 2023-02-01 NOTE — OB PROVIDER H&P - NSHPPHYSICALEXAM_GEN_ALL_CORE
Vital Signs Last 24 Hrs  T(C): 36.9 (01 Feb 2023 04:27), Max: 36.9 (01 Feb 2023 04:19)  T(F): 98.42 (01 Feb 2023 04:27), Max: 98.42 (01 Feb 2023 04:27)  HR: 106 (01 Feb 2023 04:59) (96 - 106)  BP: 119/79 (01 Feb 2023 04:59) (119/79 - 136/82)  RR: 16 (01 Feb 2023 04:19) (16 - 16)    Parameters below as of 01 Feb 2023 04:19  Patient On (Oxygen Delivery Method): room air    Assessment reveals VSS  General: Female sitting comfortably in no apparent distress  Neuro: No facial asymmetry, no slurred speech, moves all 4 extremities  Mood: Alert and lucid, appropriate mood and affect  A&Ox3  Lungs- clear bilateral  Heart- normal rate and regular rhythm  Extremities- Warm, Dry, no edema present, good pulses   Abdomen soft, NT, gravid  Cat 1 tracing, ctx every 3-5 mins  Transabdominal Ultrasound- vtx      PLAN: Admit for IOL SROM

## 2023-02-01 NOTE — OB RN PATIENT PROFILE - FALL HARM RISK - UNIVERSAL INTERVENTIONS
Bed in lowest position, wheels locked, appropriate side rails in place/Call bell, personal items and telephone in reach/Instruct patient to call for assistance before getting out of bed or chair/Non-slip footwear when patient is out of bed/[place] to call system/Physically safe environment - no spills, clutter or unnecessary equipment/Purposeful Proactive Rounding/Room/bathroom lighting operational, light cord in reach

## 2023-02-02 ENCOUNTER — NON-APPOINTMENT (OUTPATIENT)
Age: 28
End: 2023-02-02

## 2023-02-02 ENCOUNTER — APPOINTMENT (OUTPATIENT)
Dept: OBGYN | Facility: CLINIC | Age: 28
End: 2023-02-02

## 2023-02-02 LAB
BASOPHILS # BLD AUTO: 0.02 K/UL — SIGNIFICANT CHANGE UP (ref 0–0.2)
BASOPHILS NFR BLD AUTO: 0.1 % — SIGNIFICANT CHANGE UP (ref 0–2)
EOSINOPHIL # BLD AUTO: 0 K/UL — SIGNIFICANT CHANGE UP (ref 0–0.5)
EOSINOPHIL NFR BLD AUTO: 0 % — SIGNIFICANT CHANGE UP (ref 0–6)
HCT VFR BLD CALC: 38.1 % — SIGNIFICANT CHANGE UP (ref 34.5–45)
HGB BLD-MCNC: 12.4 G/DL — SIGNIFICANT CHANGE UP (ref 11.5–15.5)
IANC: 11.14 K/UL — HIGH (ref 1.8–7.4)
IMM GRANULOCYTES NFR BLD AUTO: 0.5 % — SIGNIFICANT CHANGE UP (ref 0–0.9)
LYMPHOCYTES # BLD AUTO: 1.21 K/UL — SIGNIFICANT CHANGE UP (ref 1–3.3)
LYMPHOCYTES # BLD AUTO: 8.9 % — LOW (ref 13–44)
MCHC RBC-ENTMCNC: 30 PG — SIGNIFICANT CHANGE UP (ref 27–34)
MCHC RBC-ENTMCNC: 32.5 GM/DL — SIGNIFICANT CHANGE UP (ref 32–36)
MCV RBC AUTO: 92.3 FL — SIGNIFICANT CHANGE UP (ref 80–100)
MONOCYTES # BLD AUTO: 1.21 K/UL — HIGH (ref 0–0.9)
MONOCYTES NFR BLD AUTO: 8.9 % — SIGNIFICANT CHANGE UP (ref 2–14)
NEUTROPHILS # BLD AUTO: 11.14 K/UL — HIGH (ref 1.8–7.4)
NEUTROPHILS NFR BLD AUTO: 81.6 % — HIGH (ref 43–77)
NRBC # BLD: 0 /100 WBCS — SIGNIFICANT CHANGE UP (ref 0–0)
NRBC # FLD: 0 K/UL — SIGNIFICANT CHANGE UP (ref 0–0)
PLATELET # BLD AUTO: 166 K/UL — SIGNIFICANT CHANGE UP (ref 150–400)
RBC # BLD: 4.13 M/UL — SIGNIFICANT CHANGE UP (ref 3.8–5.2)
RBC # FLD: 12.7 % — SIGNIFICANT CHANGE UP (ref 10.3–14.5)
WBC # BLD: 13.65 K/UL — HIGH (ref 3.8–10.5)
WBC # FLD AUTO: 13.65 K/UL — HIGH (ref 3.8–10.5)

## 2023-02-02 PROCEDURE — 59400 OBSTETRICAL CARE: CPT | Mod: GC

## 2023-02-02 RX ORDER — BENZOCAINE 10 %
1 GEL (GRAM) MUCOUS MEMBRANE EVERY 6 HOURS
Refills: 0 | Status: DISCONTINUED | OUTPATIENT
Start: 2023-02-02 | End: 2023-02-03

## 2023-02-02 RX ORDER — HYDROCORTISONE 1 %
1 OINTMENT (GRAM) TOPICAL EVERY 6 HOURS
Refills: 0 | Status: DISCONTINUED | OUTPATIENT
Start: 2023-02-02 | End: 2023-02-03

## 2023-02-02 RX ORDER — IBUPROFEN 200 MG
1 TABLET ORAL
Qty: 0 | Refills: 0 | DISCHARGE
Start: 2023-02-02

## 2023-02-02 RX ORDER — OXYCODONE HYDROCHLORIDE 5 MG/1
5 TABLET ORAL ONCE
Refills: 0 | Status: DISCONTINUED | OUTPATIENT
Start: 2023-02-02 | End: 2023-02-03

## 2023-02-02 RX ORDER — LEVOTHYROXINE SODIUM 125 MCG
1 TABLET ORAL
Qty: 0 | Refills: 0 | DISCHARGE

## 2023-02-02 RX ORDER — SIMETHICONE 80 MG/1
80 TABLET, CHEWABLE ORAL EVERY 4 HOURS
Refills: 0 | Status: DISCONTINUED | OUTPATIENT
Start: 2023-02-02 | End: 2023-02-03

## 2023-02-02 RX ORDER — DIBUCAINE 1 %
1 OINTMENT (GRAM) RECTAL EVERY 6 HOURS
Refills: 0 | Status: DISCONTINUED | OUTPATIENT
Start: 2023-02-02 | End: 2023-02-03

## 2023-02-02 RX ORDER — PRAMOXINE HYDROCHLORIDE 150 MG/15G
1 AEROSOL, FOAM RECTAL EVERY 4 HOURS
Refills: 0 | Status: DISCONTINUED | OUTPATIENT
Start: 2023-02-02 | End: 2023-02-03

## 2023-02-02 RX ORDER — DIPHENHYDRAMINE HCL 50 MG
25 CAPSULE ORAL EVERY 6 HOURS
Refills: 0 | Status: DISCONTINUED | OUTPATIENT
Start: 2023-02-02 | End: 2023-02-03

## 2023-02-02 RX ORDER — IBUPROFEN 200 MG
600 TABLET ORAL EVERY 6 HOURS
Refills: 0 | Status: COMPLETED | OUTPATIENT
Start: 2023-02-02 | End: 2024-01-01

## 2023-02-02 RX ORDER — OXYCODONE HYDROCHLORIDE 5 MG/1
5 TABLET ORAL
Refills: 0 | Status: DISCONTINUED | OUTPATIENT
Start: 2023-02-02 | End: 2023-02-03

## 2023-02-02 RX ORDER — KETOROLAC TROMETHAMINE 30 MG/ML
30 SYRINGE (ML) INJECTION ONCE
Refills: 0 | Status: DISCONTINUED | OUTPATIENT
Start: 2023-02-02 | End: 2023-02-02

## 2023-02-02 RX ORDER — TETANUS TOXOID, REDUCED DIPHTHERIA TOXOID AND ACELLULAR PERTUSSIS VACCINE, ADSORBED 5; 2.5; 8; 8; 2.5 [IU]/.5ML; [IU]/.5ML; UG/.5ML; UG/.5ML; UG/.5ML
0.5 SUSPENSION INTRAMUSCULAR ONCE
Refills: 0 | Status: COMPLETED | OUTPATIENT
Start: 2023-02-02

## 2023-02-02 RX ORDER — SODIUM CHLORIDE 9 MG/ML
3 INJECTION INTRAMUSCULAR; INTRAVENOUS; SUBCUTANEOUS EVERY 8 HOURS
Refills: 0 | Status: DISCONTINUED | OUTPATIENT
Start: 2023-02-02 | End: 2023-02-03

## 2023-02-02 RX ORDER — AER TRAVELER 0.5 G/1
1 SOLUTION RECTAL; TOPICAL EVERY 4 HOURS
Refills: 0 | Status: DISCONTINUED | OUTPATIENT
Start: 2023-02-02 | End: 2023-02-03

## 2023-02-02 RX ORDER — MAGNESIUM HYDROXIDE 400 MG/1
30 TABLET, CHEWABLE ORAL
Refills: 0 | Status: DISCONTINUED | OUTPATIENT
Start: 2023-02-02 | End: 2023-02-03

## 2023-02-02 RX ORDER — LANOLIN
1 OINTMENT (GRAM) TOPICAL EVERY 6 HOURS
Refills: 0 | Status: DISCONTINUED | OUTPATIENT
Start: 2023-02-02 | End: 2023-02-03

## 2023-02-02 RX ORDER — ACETAMINOPHEN 500 MG
975 TABLET ORAL
Refills: 0 | Status: DISCONTINUED | OUTPATIENT
Start: 2023-02-02 | End: 2023-02-03

## 2023-02-02 RX ORDER — OXYTOCIN 10 UNIT/ML
41.67 VIAL (ML) INJECTION
Qty: 20 | Refills: 0 | Status: DISCONTINUED | OUTPATIENT
Start: 2023-02-02 | End: 2023-02-02

## 2023-02-02 RX ORDER — IBUPROFEN 200 MG
600 TABLET ORAL EVERY 6 HOURS
Refills: 0 | Status: DISCONTINUED | OUTPATIENT
Start: 2023-02-02 | End: 2023-02-03

## 2023-02-02 RX ORDER — ONDANSETRON 8 MG/1
4 TABLET, FILM COATED ORAL ONCE
Refills: 0 | Status: DISCONTINUED | OUTPATIENT
Start: 2023-02-02 | End: 2023-02-02

## 2023-02-02 RX ADMIN — Medication 975 MILLIGRAM(S): at 21:27

## 2023-02-02 RX ADMIN — Medication 600 MILLIGRAM(S): at 17:33

## 2023-02-02 RX ADMIN — Medication 975 MILLIGRAM(S): at 06:51

## 2023-02-02 RX ADMIN — Medication 0.2 MILLIGRAM(S): at 06:21

## 2023-02-02 RX ADMIN — Medication 975 MILLIGRAM(S): at 06:21

## 2023-02-02 RX ADMIN — Medication 600 MILLIGRAM(S): at 12:42

## 2023-02-02 RX ADMIN — Medication 30 MILLIGRAM(S): at 03:30

## 2023-02-02 RX ADMIN — Medication 0.2 MILLIGRAM(S): at 17:37

## 2023-02-02 RX ADMIN — Medication 975 MILLIGRAM(S): at 22:00

## 2023-02-02 RX ADMIN — Medication 600 MILLIGRAM(S): at 12:03

## 2023-02-02 RX ADMIN — Medication 0.2 MILLIGRAM(S): at 21:23

## 2023-02-02 RX ADMIN — Medication 600 MILLIGRAM(S): at 18:15

## 2023-02-02 RX ADMIN — Medication 1 TABLET(S): at 12:03

## 2023-02-02 RX ADMIN — SODIUM CHLORIDE 3 MILLILITER(S): 9 INJECTION INTRAMUSCULAR; INTRAVENOUS; SUBCUTANEOUS at 21:11

## 2023-02-02 RX ADMIN — SODIUM CHLORIDE 3 MILLILITER(S): 9 INJECTION INTRAMUSCULAR; INTRAVENOUS; SUBCUTANEOUS at 16:38

## 2023-02-02 RX ADMIN — Medication 0.2 MILLIGRAM(S): at 14:20

## 2023-02-02 RX ADMIN — Medication 150 MICROGRAM(S): at 06:21

## 2023-02-02 RX ADMIN — Medication 0.2 MILLIGRAM(S): at 01:29

## 2023-02-02 RX ADMIN — Medication 0.2 MILLIGRAM(S): at 10:38

## 2023-02-02 NOTE — OB RN DELIVERY SUMMARY - NS_CULTURES_OBGYN_ALL_OB
Called reviewed concerns, informed the is a \"normal reaction\" when taking TNF. Advised to apply cold pack to site to relieve itching or stinging and/or apply OTC hydrocortisone and benadryl ointment for relief.  Understands and agrees with plan.  Call transferred to PSR to schedule f/u ov.   No

## 2023-02-02 NOTE — OB PROVIDER DELIVERY SUMMARY - NSLOWPPHRISK_OBGYN_A_OB
No previous uterine incision/[place] Pregnancy/Less than or equal to 4 previous vaginal births/No known bleeding disorder/No history of postpartum hemorrhage/No other PPH risks indicated

## 2023-02-02 NOTE — OB RN DELIVERY SUMMARY - NSSELHIDDEN_OBGYN_ALL_OB_FT
[NS_DeliveryAttending1_OBGYN_ALL_OB_FT:[id number]],[NS_DeliveryAssist1_OBGYN_ALL_OB_FT:[id number]],[NS_DeliveryRN_OBGYN_ALL_OB_FT:[id number]=]

## 2023-02-02 NOTE — OB NEONATOLOGY/PEDIATRICIAN DELIVERY SUMMARY - NS_GESTAGEATBIRTHA_OBGYN_ALL_OB_FT
What Type Of Note Output Would You Prefer (Optional)?: Bullet Format How Severe Are Your Spot(S)?: mild Have Your Spot(S) Been Treated In The Past?: has not been treated Hpi Title: Evaluation of Skin Lesions Family Member: Brother 39w1d

## 2023-02-02 NOTE — OB RN DELIVERY SUMMARY - NS_SEPSISRSKCALC_OBGYN_ALL_OB_FT
EOS calculated successfully. EOS Risk Factor: 0.5/1000 live births ([facility] national incidence); GA=39w1d; Temp=98.42; ROM=22.95; GBS='Negative'; Antibiotics='No antibiotics or any antibiotics < 2 hrs prior to birth'

## 2023-02-02 NOTE — OB NEONATOLOGY/PEDIATRICIAN DELIVERY SUMMARY - NSPEDSNEONOTESA_OBGYN_ALL_OB_FT
Peds called to delivery for cat II FHRT. 39.1 wk female born via /CS to a 28 y/o  blood type O+ mother. Maternal history of hypothyroid on synthroid. Prenatal history of GDMA1. PNL -/-/NR/I, GBS - on . SROM at 0220 on  (~23h ROM) with clear fluids. Baby emerged, true knot x1, vigorous, crying, was w/d/s/s with APGARS of 8/9. CPAP started at 7 MOL for increased work of breathing and suboptimal oxygen saturation, 25 total minutes, max settings 5/40%, with good response. Mom plans to initiate breastfeeding, declines Hep B vaccine. EOS 0.17. Highest maternal temp 36.7. Baby is LGA.

## 2023-02-02 NOTE — DISCHARGE NOTE OB - MATERIALS PROVIDED
[facility] Screening Program/  Immunization Record/Breastfeeding Log/Guide to Postpartum Care/[facility] Hearing Screen Program/Shaken Baby Prevention Handout/Birth Certificate Instructions/Tdap Vaccination (VIS Pub Date: 2012)

## 2023-02-02 NOTE — DISCHARGE NOTE OB - CARE PLAN
1 Principal Discharge DX:	 (normal spontaneous vaginal delivery)   Principal Discharge DX:	 (normal spontaneous vaginal delivery)  Assessment and plan of treatment:	ABBEY

## 2023-02-02 NOTE — OB PROVIDER DELIVERY SUMMARY - NSPROVIDERDELIVERYNOTE_OBGYN_ALL_OB_FT
Head delivered in HUA. No nuchal. Anterior shoulder was delivered followed by the posterior shoulder and remainder of the body.     Infant passed to the mother. Cord clamping was delayed for 1 minute. Cord clamped and cut. Pediatrics at bedside given cat2 tracing. Cord gasses obtained via a segment of cord. True knot noted     Oxytocin given. Placenta was delivered spontaneously intact. Cervix intact. Uterine massage was performed. Intermittent atony ameliorated via bimanual massage and administration of IM Methergine     First degree laceration repaired w/ 2-0 chromic in a running fashion. Right labial repaired w/ 3-0 Chromic in an interrupted fashion    Adequate hemostasis. QBL 515cc  Count correct x2    Baby girl Rivas    Plan for 6a CBC and Methergine series

## 2023-02-02 NOTE — DISCHARGE NOTE OB - CARE PROVIDER_API CALL
Antoine Alcaraz)  Obstetrics and Gynecology  [address] [place] [address]  Phone: [phone]  Fax: [phone]  Follow Up Time:

## 2023-02-02 NOTE — DISCHARGE NOTE OB - PATIENT PORTAL LINK FT
You can access the FollowMyHealth Patient Portal offered by [facility] by registering at the following website: [email or web address]. By joining [facility]’s FollowMyHealth portal, you will also be able to view your health information using other applications (apps) compatible with our system.

## 2023-02-02 NOTE — DISCHARGE NOTE OB - NS MD DC FALL RISK RISK
For information on Fall & Injury Prevention, visit: [email or web address] OR  [email or web address] OR  https://www.cdc.gov/steadi/patient.html

## 2023-02-03 VITALS
RESPIRATION RATE: 20 BRPM | HEART RATE: 70 BPM | SYSTOLIC BLOOD PRESSURE: 118 MMHG | DIASTOLIC BLOOD PRESSURE: 74 MMHG | TEMPERATURE: 98 F

## 2023-02-03 RX ORDER — TETANUS TOXOID, REDUCED DIPHTHERIA TOXOID AND ACELLULAR PERTUSSIS VACCINE, ADSORBED 5; 2.5; 8; 8; 2.5 [IU]/.5ML; [IU]/.5ML; UG/.5ML; UG/.5ML; UG/.5ML
0.5 SUSPENSION INTRAMUSCULAR ONCE
Refills: 0 | Status: COMPLETED | OUTPATIENT
Start: 2023-02-03 | End: 2023-02-03

## 2023-02-03 RX ADMIN — Medication 600 MILLIGRAM(S): at 02:41

## 2023-02-03 RX ADMIN — Medication 150 MICROGRAM(S): at 05:18

## 2023-02-03 RX ADMIN — Medication 600 MILLIGRAM(S): at 03:31

## 2023-02-03 RX ADMIN — SODIUM CHLORIDE 3 MILLILITER(S): 9 INJECTION INTRAMUSCULAR; INTRAVENOUS; SUBCUTANEOUS at 04:43

## 2023-02-03 RX ADMIN — TETANUS TOXOID, REDUCED DIPHTHERIA TOXOID AND ACELLULAR PERTUSSIS VACCINE, ADSORBED 0.5 MILLILITER(S): 5; 2.5; 8; 8; 2.5 SUSPENSION INTRAMUSCULAR at 10:15

## 2023-02-03 NOTE — PROGRESS NOTE ADULT - SUBJECTIVE AND OBJECTIVE BOX
SUBJECTIVE:    Pain: Controlled    Complaints: None    MILESTONES:     Alert and oriented x 3  [x  ]    Out of bed /ambulating [  x]      Voiding [x  ]  Due to void [  ]    Tolerating a regular diet.    Infant feeding:   Breast [  ]   Bottle [  ]     Both [ X ]                           Feeding related issues or concerns:    Objective   T(C): 36.6 (23 @ 06:30), Max: 37 (23 @ 10:05)  HR: 69 (23 @ 06:30) (67 - 80)  BP: 108/72 (23 @ 06:30) (108/72 - 125/86)  RR: 18 (23 @ 06:30) (17 - 18)  SpO2: 98% (23 @ 06:30) (98% - 100%)  Wt(kg): --                        12.4   13.65 )-----------( 166      ( 2023 06:00 )             38.1         Blood Type: O Positive    RPR: Negative          MEDICATIONS  (STANDING):  acetaminophen     Tablet .. 975 milliGRAM(s) Oral <User Schedule>  diphtheria/tetanus/pertussis (acellular) Vaccine (Adacel) 0.5 milliLiter(s) IntraMuscular once  ibuprofen  Tablet. 600 milliGRAM(s) Oral every 6 hours  influenza   Vaccine 0.5 milliLiter(s) IntraMuscular once  levothyroxine 150 MICROGram(s) Oral daily  prenatal multivitamin 1 Tablet(s) Oral daily  sodium chloride 0.9% lock flush 3 milliLiter(s) IV Push every 8 hours    MEDICATIONS  (PRN):  benzocaine 20%/menthol 0.5% Spray 1 Spray(s) Topical every 6 hours PRN for Perineal discomfort  dibucaine 1% Ointment 1 Application(s) Topical every 6 hours PRN Perineal discomfort  diphenhydrAMINE 25 milliGRAM(s) Oral every 6 hours PRN Pruritus  hydrocortisone 1% Cream 1 Application(s) Topical every 6 hours PRN Moderate Pain (4-6)  lanolin Ointment 1 Application(s) Topical every 6 hours PRN nipple soreness  magnesium hydroxide Suspension 30 milliLiter(s) Oral two times a day PRN Constipation  oxyCODONE    IR 5 milliGRAM(s) Oral every 3 hours PRN Moderate to Severe Pain (4-10)  oxyCODONE    IR 5 milliGRAM(s) Oral once PRN Moderate to Severe Pain (4-10)  pramoxine 1%/zinc 5% Cream 1 Application(s) Topical every 4 hours PRN Moderate Pain (4-6)  simethicone 80 milliGRAM(s) Chew every 4 hours PRN Gas  witch hazel Pads 1 Application(s) Topical every 4 hours PRN Perineal discomfort      ASSESSMENT:      27y      G 1   P   1001     PP Day # 1      Delivery:   [ X ]             [   ]        QBL - 515, s/p Methergine/Series    Assisted delivery  :    Vacuum   [  ]   Forceps)  [  ]    Indication for assisted delivery: Tracing Abn [  ]     Maternal Exhaustion [  ]     Other [  ]    Past medical history significant for HPI:  28y/o  @39wks presents with leaking of clear fluid since 2:30am and mild cramps.   Reports good fetal movement  Denies VB    Allergies: Denies   Medications: PNV, Synthroid 150mcg    Medical HX: Hypothyroidism   Surgical HX: Denies  Denies Etoh/Smoke/Drugs/Psy (2023 04:59)      Current Issues:     Breasts: Soft [x  ]    Engorged [  ]  Nipples:  Abdomen: Soft [ x ]  Nontender [  ]  Nondistended [  ]   Distended [  ]    Vagina: Lochia   Mod [x  ]      Scant [  ]  Heavy  [  ]    Perineum:  Intact [  ]   Laceration   [ X ]   Type of laceration [    2nd degree/R Labial      ]    Episiotomy [  ]    Type of episiotomy  [              ]    Lower extremities: Edema  [  ] noted bilaterally .  Nontender Luís  [  ]  Negative Clyde's sign [  ] Positive pedal pulses [  ]    Other relevant physical exam findings;      PLAN:    Plan: Increase ambulation, analgesia PRN and pain medication protocol standing oxycodone, ibuprofen and acetaminophen.    Diet: Continue regular diet    Labs:    Continue routine postpartum care.     Diabetes - For repeat Glucose testing in 6 weeks    Discharge Planning [x  ]    For  Discharge Today  [ X  ]    Consults :  Social Work [  ]     Lactation [ X  ]    Other [      ]

## 2023-02-06 ENCOUNTER — APPOINTMENT (OUTPATIENT)
Dept: ANTEPARTUM | Facility: CLINIC | Age: 28
End: 2023-02-06

## 2023-03-16 ENCOUNTER — APPOINTMENT (OUTPATIENT)
Dept: OBGYN | Facility: CLINIC | Age: 28
End: 2023-03-16
Payer: COMMERCIAL

## 2023-03-16 VITALS
DIASTOLIC BLOOD PRESSURE: 84 MMHG | BODY MASS INDEX: 26.22 KG/M2 | HEIGHT: 63 IN | RESPIRATION RATE: 16 BRPM | HEART RATE: 72 BPM | TEMPERATURE: 97.6 F | OXYGEN SATURATION: 97 % | SYSTOLIC BLOOD PRESSURE: 122 MMHG | WEIGHT: 148 LBS

## 2023-03-16 DIAGNOSIS — Z30.019 ENCOUNTER FOR INITIAL PRESCRIPTION OF CONTRACEPTIVES, UNSPECIFIED: ICD-10-CM

## 2023-03-16 PROCEDURE — 0503F POSTPARTUM CARE VISIT: CPT

## 2023-03-16 RX ORDER — NORETHINDRONE 0.35 MG/1
0.35 TABLET ORAL DAILY
Qty: 1 | Refills: 7 | Status: ACTIVE | COMMUNITY
Start: 2023-03-16 | End: 1900-01-01

## 2023-03-16 NOTE — HISTORY OF PRESENT ILLNESS
[FreeTextEntry1] : ALIA MEAD 26 YO, presents for Post Partum & Contraception \par G 1  P 1001 - 1  on 2023 @ [facility]\par Nursing\par Medication: PNV\par No family history of breast cancer\par No complaints. \par Contraception discussion. \par From IUD to POP to Nexplanon .\par Patient want's POP.